# Patient Record
Sex: MALE | Race: WHITE | Employment: FULL TIME
[De-identification: names, ages, dates, MRNs, and addresses within clinical notes are randomized per-mention and may not be internally consistent; named-entity substitution may affect disease eponyms.]

---

## 2017-03-14 ENCOUNTER — TELEPHONE (OUTPATIENT)
Dept: CASE MANAGEMENT | Age: 62
End: 2017-03-14

## 2017-03-22 ENCOUNTER — TELEPHONE (OUTPATIENT)
Dept: CASE MANAGEMENT | Age: 62
End: 2017-03-22

## 2018-03-22 ENCOUNTER — TELEPHONE (OUTPATIENT)
Dept: CASE MANAGEMENT | Age: 63
End: 2018-03-22

## 2018-03-22 NOTE — TELEPHONE ENCOUNTER
A lung screening packet was mailed to Jeffery Shaffero on 3/22/2018. This packet includes: an appointment reminder for his CT Lung Screening scheduled for 3/28/18 , a patient questionnaire,a lung screening brochure and the [facility] Lung Screening Patient Education Sheet.       HEMA Goodwin.S., R.T.(R)(T)  Patient [address] [facility]    [phone]

## 2018-03-28 ENCOUNTER — HOSPITAL ENCOUNTER (OUTPATIENT)
Dept: CT IMAGING | Age: 63
Discharge: HOME | End: 2018-03-30
Payer: COMMERCIAL

## 2018-03-28 DIAGNOSIS — F17.211 CIGARETTE NICOTINE DEPENDENCE IN REMISSION: ICD-10-CM

## 2018-03-28 PROCEDURE — G0297 LDCT FOR LUNG CA SCREEN: HCPCS

## 2018-03-29 ENCOUNTER — TELEPHONE (OUTPATIENT)
Dept: CASE MANAGEMENT | Age: 63
End: 2018-03-29

## 2018-03-29 NOTE — TELEPHONE ENCOUNTER
No call, encounter opened to process CT Lung Screening. CT Lung Screen 3/28/18 :  Lung parenchyma and pleura: The tracheobronchial tree is patent. There are mild centrilobular bullous emphysematous changes in the lungs bilaterally, most prominently involving the upper lobes. There is a stable 3 mm soft tissue nodule in the left lung apex (series 4, image 24). There is no focal consolidation, pleural effusion or pneumothorax. There is minimal biapical pleural-parenchymal thickening/scarring. There are minimal patchy bibasal dependent pulmonary parenchymal opacities consistent with minimal atelectatic change. Zane Beauchamp is a  61 y.o. male who is a former ( 3 years) smoker with a 35 pack year smoking history. Letter mailed to patient  3/29/2018 encouraging him  to call his physician for results and follow up recommendations if needed.             Ivy Dinorah, B.S., R.T.(R)(T)  Patient [address] [facility]    [phone]

## 2018-11-30 ENCOUNTER — HOSPITAL ENCOUNTER (OUTPATIENT)
Age: 63
Discharge: HOME | End: 2018-12-02
Payer: COMMERCIAL

## 2018-11-30 PROCEDURE — 88305 TISSUE EXAM BY PATHOLOGIST: CPT

## 2019-03-04 ENCOUNTER — TELEPHONE (OUTPATIENT)
Dept: CASE MANAGEMENT | Age: 64
End: 2019-03-04

## 2019-04-16 ENCOUNTER — TELEPHONE (OUTPATIENT)
Dept: CASE MANAGEMENT | Age: 64
End: 2019-04-16

## 2019-04-18 ENCOUNTER — TELEPHONE (OUTPATIENT)
Dept: CASE MANAGEMENT | Age: 64
End: 2019-04-18

## 2019-04-18 VITALS — WEIGHT: 216 LBS | HEIGHT: 75 IN | BODY MASS INDEX: 26.86 KG/M2

## 2019-04-18 NOTE — TELEPHONE ENCOUNTER
Patient did not call back with information. Info obtained through Elsa Neely MD office. Height and weight have been recorded.

## 2019-04-24 ENCOUNTER — HOSPITAL ENCOUNTER (OUTPATIENT)
Dept: CT IMAGING | Age: 64
Discharge: HOME | End: 2019-04-26
Payer: COMMERCIAL

## 2019-04-24 DIAGNOSIS — Z87.891 PERSONAL HISTORY OF TOBACCO USE, PRESENTING HAZARDS TO HEALTH: ICD-10-CM

## 2019-04-24 PROCEDURE — G0297 LDCT FOR LUNG CA SCREEN: HCPCS

## 2019-04-25 ENCOUNTER — TELEPHONE (OUTPATIENT)
Dept: CASE MANAGEMENT | Age: 64
End: 2019-04-25

## 2019-04-25 NOTE — TELEPHONE ENCOUNTER
No call, encounter opened to process CT Lung Screening. CT Lung Screen 19 :  Right lun. Pleural-based pulmonary nodule (series 4, image 22), measured at   approximately 0.75 by approximately 0.63 cm. 2. No focal area of infiltrate/pneumonia, pneumothorax or pleural   effusion       Left lun. Pulmonary nodule left upper lobe is again noted, measured at   approximately 0.33 cm. 2. No focal area of infiltrate/pneumonia, pneumothorax or pleural   effusion. 3. Left apical pleural-parenchymal scarring and soft tissue thickening   is again seen.       Visualized thyroid, heart, esophagus, stomach, liver, gallbladder,   spleen, accessory spleen and kidneys are grossly unremarkable limited   evaluation. Thoracic and abdominal aortic vascular calcifications. No   large destructive lytic or blastic bony lesion is identified on   limited visualization of osseous structures.           Impression   Lung - RADS Category 3 :  Probably benign finding (s) -   short term follow - up suggested, includes nodules with a low   likelihood of becoming a clinically active cancer - Follow-up CT scan   in 6 months. 1. Bilateral pulmonary nodules as described above.  The largest is   pleural-based and in the right lung measured at 0.75 x 0.63 cm.             Pack years: 28    Social History     Tobacco Use   Smoking Status Former Smoker    Packs/day: 1.00    Years: 35.00    Pack years: 35.00    Last attempt to quit: 3/22/2014    Years since quittin.0   Tobacco Comment    Per CT Lung Screening order 3/2017               MAX Poe., R.T.(R)(T)  Lung Nodule Navigator  [facility] [address] [facility]  [phone]

## 2019-09-30 ENCOUNTER — TELEPHONE (OUTPATIENT)
Dept: CASE MANAGEMENT | Age: 64
End: 2019-09-30

## 2019-10-11 ENCOUNTER — TELEPHONE (OUTPATIENT)
Dept: CASE MANAGEMENT | Age: 64
End: 2019-10-11

## 2019-11-08 ENCOUNTER — TELEPHONE (OUTPATIENT)
Dept: CASE MANAGEMENT | Age: 64
End: 2019-11-08

## 2019-11-22 ENCOUNTER — TELEPHONE (OUTPATIENT)
Dept: CASE MANAGEMENT | Age: 64
End: 2019-11-22

## 2020-01-24 ENCOUNTER — HOSPITAL ENCOUNTER (OUTPATIENT)
Age: 65
Discharge: HOME | End: 2020-01-26

## 2020-01-24 PROCEDURE — 88305 TISSUE EXAM BY PATHOLOGIST: CPT

## 2020-09-02 ENCOUNTER — HOSPITAL ENCOUNTER (OUTPATIENT)
Age: 65
Discharge: HOME | End: 2020-09-04
Payer: MEDICARE

## 2020-09-02 PROCEDURE — 88305 TISSUE EXAM BY PATHOLOGIST: CPT

## 2025-05-01 ENCOUNTER — HOSPITAL ENCOUNTER (OUTPATIENT)
Age: 70
Discharge: HOME | End: 2025-05-03

## 2025-05-07 LAB — SURGICAL PATHOLOGY REPORT: NORMAL
